# Patient Record
Sex: FEMALE | Race: OTHER | HISPANIC OR LATINO | URBAN - METROPOLITAN AREA
[De-identification: names, ages, dates, MRNs, and addresses within clinical notes are randomized per-mention and may not be internally consistent; named-entity substitution may affect disease eponyms.]

---

## 2019-05-30 ENCOUNTER — EMERGENCY (EMERGENCY)
Facility: HOSPITAL | Age: 49
LOS: 1 days | Discharge: HOME | End: 2019-05-30
Attending: EMERGENCY MEDICINE
Payer: SELF-PAY

## 2019-05-30 VITALS
HEART RATE: 81 BPM | HEIGHT: 57.09 IN | TEMPERATURE: 98 F | WEIGHT: 160.06 LBS | DIASTOLIC BLOOD PRESSURE: 63 MMHG | SYSTOLIC BLOOD PRESSURE: 107 MMHG | OXYGEN SATURATION: 97 % | RESPIRATION RATE: 16 BRPM

## 2019-05-30 PROCEDURE — 73030 X-RAY EXAM OF SHOULDER: CPT | Mod: 26,RT

## 2019-05-30 PROCEDURE — 73030 X-RAY EXAM OF SHOULDER: CPT

## 2019-05-30 PROCEDURE — 99283 EMERGENCY DEPT VISIT LOW MDM: CPT

## 2019-05-30 PROCEDURE — T1013: CPT

## 2019-05-30 NOTE — ED STATDOCS - MUSCULOSKELETAL, MLM
No TTP to clavicle or AC joint, grossly negative shoulder joint tenderness, positive supraspinatus tendinitis. Distal arm is neurovascularly intact

## 2019-05-30 NOTE — ED STATDOCS - OBJECTIVE STATEMENT
47 y/o FM pt with hx of hypothyroidism presents to ED s/p fall out of her bed on Tuesday resulting in persistent right shoulder pain. Pt states she was holding a baby at time, only impact was on her RUE. Pain exacerbation with IR and ER of arm, pain described as aching. NKDA. Pt has not taken any pain medications today. Denies any radiating pain, N/V, LOC, hitting her head, lacerations, neck pain or back pain    No TTP to clvocal or AC joint grossly (negative) shoulder joint tenderness, positive  supraspenatustendinitis. distal arm neuro vasculy intact    fall to right shouldr symtoms of rotaor cuff tear, will Xray and re-eval.. 47 y/o F pt with hx of hypothyroidism presents to ED s/p fall out of her bed on Tuesday resulting in persistent right shoulder pain. Pt states she was holding a baby at time so she was unable to brace herself, the only impact was on her RUE. Pain exacerbation with IR and ER of arm, pain described as aching. NKDA. Pt has not taken any pain medications today. Denies any radiating pain, N/V, LOC, hitting her head, lacerations, neck pain or back pain

## 2019-05-30 NOTE — ED STATDOCS - CARE PLAN
Principal Discharge DX:	Acute pain of right shoulder  Assessment and plan of treatment:	Continue with pain medication   F/U with Orthopedic as discussed

## 2019-05-30 NOTE — ED STATDOCS - PROGRESS NOTE DETAILS
No acute fracture/Normal imaging on ED read. Official Read and report pending Radiologist review in the Morning. Patient will be contacted if any additional findings  are made on official read.